# Patient Record
Sex: FEMALE | Race: WHITE | Employment: FULL TIME | URBAN - METROPOLITAN AREA
[De-identification: names, ages, dates, MRNs, and addresses within clinical notes are randomized per-mention and may not be internally consistent; named-entity substitution may affect disease eponyms.]

---

## 2021-09-03 ENCOUNTER — HOSPITAL ENCOUNTER (EMERGENCY)
Facility: CLINIC | Age: 46
Discharge: HOME | End: 2021-09-03
Attending: PHYSICIAN ASSISTANT | Admitting: PHYSICIAN ASSISTANT
Payer: OTHER MISCELLANEOUS

## 2021-09-03 VITALS
OXYGEN SATURATION: 98 % | HEART RATE: 84 BPM | SYSTOLIC BLOOD PRESSURE: 144 MMHG | RESPIRATION RATE: 18 BRPM | TEMPERATURE: 97.8 F | DIASTOLIC BLOOD PRESSURE: 92 MMHG

## 2021-09-03 DIAGNOSIS — S91.012A LACERATION OF LEFT ANKLE, INITIAL ENCOUNTER: ICD-10-CM

## 2021-09-03 PROCEDURE — 99203 OFFICE O/P NEW LOW 30 MIN: CPT | Mod: 25 | Performed by: PHYSICIAN ASSISTANT

## 2021-09-03 PROCEDURE — 90715 TDAP VACCINE 7 YRS/> IM: CPT | Performed by: PHYSICIAN ASSISTANT

## 2021-09-03 PROCEDURE — 12001 RPR S/N/AX/GEN/TRNK 2.5CM/<: CPT | Performed by: PHYSICIAN ASSISTANT

## 2021-09-03 PROCEDURE — 90471 IMMUNIZATION ADMIN: CPT | Performed by: PHYSICIAN ASSISTANT

## 2021-09-03 PROCEDURE — G0463 HOSPITAL OUTPT CLINIC VISIT: HCPCS | Mod: 25 | Performed by: PHYSICIAN ASSISTANT

## 2021-09-03 PROCEDURE — 250N000011 HC RX IP 250 OP 636: Performed by: PHYSICIAN ASSISTANT

## 2021-09-03 RX ADMIN — CLOSTRIDIUM TETANI TOXOID ANTIGEN (FORMALDEHYDE INACTIVATED), CORYNEBACTERIUM DIPHTHERIAE TOXOID ANTIGEN (FORMALDEHYDE INACTIVATED), BORDETELLA PERTUSSIS TOXOID ANTIGEN (GLUTARALDEHYDE INACTIVATED), BORDETELLA PERTUSSIS FILAMENTOUS HEMAGGLUTININ ANTIGEN (FORMALDEHYDE INACTIVATED), BORDETELLA PERTUSSIS PERTACTIN ANTIGEN, AND BORDETELLA PERTUSSIS FIMBRIAE 2/3 ANTIGEN 0.5 ML: 5; 2; 2.5; 5; 3; 5 INJECTION, SUSPENSION INTRAMUSCULAR at 18:34

## 2021-09-03 NOTE — ED PROVIDER NOTES
History     Chief Complaint   Patient presents with     Laceration     left lower leg     HPI  Jett Cazares is a 45 year old female who presents to urgent care with concern of a laceration to her left lower leg which occurred earlier this afternoon.  Patient reports that she was at work when she cut the ankle on the metal edge of a bed frame resulting in laceration.  Since then she has moderate to severe pain.  However was able to continue her workday without difficulty.  She complains of persistent bleeding from the area.  No distal numbness, no concern for foreign body in the wound. She is unsure the date of her last tetanus vaccine however records indicate it was approximately 9 years ago.      Allergies:  No Known Allergies    Problem List:    There are no problems to display for this patient.     Past Medical History:    No past medical history on file.    Past Surgical History:    No past surgical history on file.    Family History:    No family history on file.    Social History:  Marital Status:  Single [1]  Social History     Tobacco Use     Smoking status: Not on file   Substance Use Topics     Alcohol use: Not on file     Drug use: Not on file        Medications:    No current outpatient medications on file.    Review of Systems  CONSTITUTIONAL:NEGATIVE for fever, chills, change in weight  INTEGUMENTARY/SKIN: POSITIVE for laceration left ankle   RESP:NEGATIVE for significant cough or SOB  MUSCULOSKELETAL: POSITIVE  for left ankle pain at site of laceration and NEGATIVE for other concerning arthralgias or myalgias   NEURO: NEGATIVE for numbness, anesthesia  Physical Exam   BP: (!) 144/92  Pulse: 84  Temp: 97.8  F (36.6  C)  Resp: 18  SpO2: 98 %  Physical Exam  Constitutional:       General: She is not in acute distress.     Appearance: She is not ill-appearing or toxic-appearing.   Cardiovascular:      Pulses:           Posterior tibial pulses are 2+ on the left side.   Musculoskeletal:      Left  ankle: Swelling and laceration present. No deformity or ecchymosis. No tenderness. Normal range of motion. Anterior drawer test negative. Normal pulse.   Skin:     General: Skin is warm and dry.      Findings: Laceration (1.5 cm subcutaneous laceration) present. No abrasion, ecchymosis or erythema.   Neurological:      Mental Status: She is alert.      Sensory: No sensory deficit.       ED [facility]    -Laceration Repair    Date/Time: 9/3/2021 6:20 PM  Performed by: Ansuha Auguste PA-C  Authorized by: Anusha Auguste PA-C       ANESTHESIA (see MAR for exact dosages):     Anesthesia method:  Local infiltration    Local anesthetic:  Lidocaine 1% w/o epi  LACERATION DETAILS     Location: left ankle.    Length (cm):  1.5    REPAIR TYPE:     Repair type:  Simple      EXPLORATION:     Wound exploration: wound explored through full range of motion      Wound extent: no nerve damage, no tendon damage, no underlying fracture and no vascular damage      Contaminated: no      TREATMENT:     Area cleansed with:  Hibiclens    Amount of cleaning:  Standard    SKIN REPAIR     Repair method:  Sutures    Suture size:  4-0    Suture material:  Nylon    Suture technique:  Simple interrupted    Number of sutures:  5    APPROXIMATION     Approximation:  Close    POST-PROCEDURE DETAILS     Dressing:  Antibiotic ointment             Critical Care time:  none        No results found for this or any previous visit (from the past 24 hour(s)).    Medications   Tdap (tetanus-diphtheria-acell pertussis) (ADACEL) injection 0.5 mL (0.5 mLs Intramuscular Given 9/3/21 1834)     Assessments & Plan (with Medical Decision Making)     I have reviewed the nursing notes.    I have reviewed the findings, diagnosis, plan and need for follow up with the patient.       New Prescriptions    No medications on file     Final diagnoses:   Laceration of left ankle, initial encounter     45-year-old female presents to  the urgent care with concern of a laceration to her right ankle after she sustained a cut from a metal bed frame at work earlier today.  Physical exam findings were significant for 1.5 cm subcutaneous laceration.  After discussing her/benefits patient agreed to proceed with primary closure of her wound with sutures.  She tolerated placement of five 4-0 Nylon sutures.  Tetanus vaccine was updated prior to discharge.  Follow up with PCP for suture removal in 10 days.  Suture care instructions, signs of infection, worrisome reasons to return to ER/UC sooner discussed.     Disclaimer: This note consists of symbols derived from keyboarding, dictation, and/or voice recognition software. As a result, there may be errors in the script that have gone undetected.  Please consider this when interpreting information found in the chart.    9/3/2021   [facility] EMERGENCY DEPT     Anusha Auguste PA-C  09/03/21 [id number]

## 2023-10-06 ENCOUNTER — HOSPITAL ENCOUNTER (EMERGENCY)
Facility: CLINIC | Age: 48
Discharge: HOME | End: 2023-10-06
Attending: PHYSICIAN ASSISTANT | Admitting: PHYSICIAN ASSISTANT
Payer: COMMERCIAL

## 2023-10-06 VITALS
RESPIRATION RATE: 16 BRPM | OXYGEN SATURATION: 98 % | DIASTOLIC BLOOD PRESSURE: 99 MMHG | SYSTOLIC BLOOD PRESSURE: 174 MMHG | TEMPERATURE: 98.1 F | HEART RATE: 67 BPM

## 2023-10-06 DIAGNOSIS — K04.7 DENTAL ABSCESS: ICD-10-CM

## 2023-10-06 PROCEDURE — 99213 OFFICE O/P EST LOW 20 MIN: CPT | Performed by: PHYSICIAN ASSISTANT

## 2023-10-06 PROCEDURE — G0463 HOSPITAL OUTPT CLINIC VISIT: HCPCS | Performed by: PHYSICIAN ASSISTANT

## 2023-10-06 RX ORDER — PENICILLIN V POTASSIUM 500 MG/1
500 TABLET, FILM COATED ORAL 4 TIMES DAILY
Qty: 28 TABLET | Refills: 0 | Status: SHIPPED | OUTPATIENT
Start: 2023-10-06 | End: 2023-10-13

## 2023-10-06 RX ORDER — FLUCONAZOLE 150 MG/1
TABLET ORAL
Qty: 2 TABLET | Refills: 0 | Status: SHIPPED | OUTPATIENT
Start: 2023-10-06 | End: 2023-10-09

## 2023-10-06 ASSESSMENT — ACTIVITIES OF DAILY LIVING (ADL): ADLS_ACUITY_SCORE: 35

## 2023-10-06 NOTE — ED PROVIDER NOTES
History   No chief complaint on file.    HPI  Jett Cazares is a 47 year old female who is here urgent care with concern for potential dental infection.  Patient reports had tooth extraction 10/3/23 and entered told her there was signs of infection at that time however no antibiotics were given.  She subsequently developed increasing pain, swelling on the left lateral aspect of her jaw which radiates to her head into her throat/neck.  She also complained of subjective fever, chills, myalgias, wheezing, decreased appetite due to discomfort.  She has attempted to treat with tylenol/ibuprofen and saltwater gargles as needed for pain without relief.  She has follow up appointment with with dentist next week.      Allergies:  No Known Allergies    Problem List:    There are no problems to display for this patient.       Past Medical History:    No past medical history on file.    Past Surgical History:    No past surgical history on file.    Family History:    No family history on file.    Social History:  Marital Status:  Single [1]        Medications:    fluconazole (DIFLUCAN) 150 MG tablet  penicillin V (VEETID) 500 MG tablet      Review of Systems  CONSTITUTIONAL:POSITIVE  for subjective fever, chills, myalgias and NEGATIVE for objective fever  INTEGUMENTARY/SKIN: NEGATIVE for worrisome rashes, moles or lesions  EYES: NEGATIVE for vision changes or irritation  ENT/MOUTH: POSITIVE for left sided dental pain, facial swelling, sore throat, left ear pain  RESP:POSITIVE for wheezing NEGATIVE for cough, shortness of breath   GI:POSITIVE for decreased appetite NEGATIVE for vomiting, diarrhea   Physical Exam   BP: (!) 174/99  Pulse: 67  Temp: 98.1  F (36.7  C)  Resp: 16  SpO2: 98 %    Physical Exam  GENERAL APPEARANCE: healthy, alert and no distress  EYES: EOMI,  PERRL, conjunctiva clear  HENT: ear canals and TM's normal.  Nasal and oral mucosa moist.  Posterior pharynx nonerythematous without exudate.  Patient does have  recent extraction of her posterior left lower molar which has surrounding gum erythema, swelling, purulent discharge  NECK: supple, nontender, no lymphadenopathy  RESP: lungs clear to auscultation - no rales, rhonchi or wheezes  CV: regular rates and rhythm, normal S1 S2, no murmur noted  SKIN: no suspicious lesions or rashes  ED Course                 Procedures       Critical Care time:  none        No results found for this or any previous visit (from the past 24 hour(s)).  Medications - No data to display    Assessments & Plan (with Medical Decision Making)     I have reviewed the nursing notes.  I have reviewed the findings, diagnosis, plan and need for follow up with the patient.       New Prescriptions    FLUCONAZOLE (DIFLUCAN) 150 MG TABLET    Take one tablet now, and one tablet in three days    PENICILLIN V (VEETID) 500 MG TABLET    Take 1 tablet (500 mg) by mouth 4 times daily for 7 days     Final diagnoses:   Dental abscess     47-year-old female presents to urgent care with concern over possible dental infection after having recent extraction 10/3/2023 with increasing erythema of her gums, pain, left-sided facial swelling.  She had elevated blood pressure upon arrival, remainder vital signs stable.  Physical exam findings as described above are consistent with odontogenic infection/dental abscess differential would also include pulpitis.  She was discharged home with prescription for penicillin.  Prescription for fluconazole given if she reports being prone to yeast infections with antibiotic use.  Follow-up with dentist next week as previously scheduled.  Worrisome reasons to return to the ER/UC or seek medical care sooner discussed.    Disclaimer: This note consists of symbols derived from keyboarding, dictation, and/or voice recognition software. As a result, there may be errors in the script that have gone undetected.  Please consider this when interpreting information found in the  chart.    10/6/2023   [facility] EMERGENCY DEPT       Anusha Auguste PA-C  10/07/23 1453

## 2023-10-06 NOTE — ED TRIAGE NOTES
Patients presents with pain in mouth was at the Dentist on Tuesday and has pain that  radiates from mouth to ear on left.  Concern for infection.